# Patient Record
Sex: MALE | Race: ASIAN | Employment: OTHER | URBAN - METROPOLITAN AREA
[De-identification: names, ages, dates, MRNs, and addresses within clinical notes are randomized per-mention and may not be internally consistent; named-entity substitution may affect disease eponyms.]

---

## 2017-09-05 ENCOUNTER — LAB ENCOUNTER (OUTPATIENT)
Dept: LAB | Facility: HOSPITAL | Age: 82
End: 2017-09-05
Attending: FAMILY MEDICINE
Payer: MEDICARE

## 2017-09-05 DIAGNOSIS — D64.9 ABSOLUTE ANEMIA: Primary | ICD-10-CM

## 2017-09-05 DIAGNOSIS — I25.10 CORONARY ATHEROSCLEROSIS OF NATIVE CORONARY ARTERY: ICD-10-CM

## 2017-09-05 DIAGNOSIS — N18.30 CHRONIC KIDNEY DISEASE, STAGE III (MODERATE) (HCC): ICD-10-CM

## 2017-09-05 DIAGNOSIS — Z00.01 ENCOUNTER FOR GENERAL ADULT MEDICAL EXAMINATION WITH ABNORMAL FINDINGS: ICD-10-CM

## 2017-09-05 LAB
ALBUMIN SERPL-MCNC: 3.8 G/DL (ref 3.5–4.8)
ALP LIVER SERPL-CCNC: 59 U/L (ref 45–117)
ALT SERPL-CCNC: 25 U/L (ref 17–63)
AST SERPL-CCNC: 19 U/L (ref 15–41)
BASOPHILS # BLD AUTO: 0.02 X10(3) UL (ref 0–0.1)
BASOPHILS NFR BLD AUTO: 0.4 %
BILIRUB SERPL-MCNC: 0.4 MG/DL (ref 0.1–2)
BUN BLD-MCNC: 22 MG/DL (ref 8–20)
CALCIUM BLD-MCNC: 9 MG/DL (ref 8.3–10.3)
CHLORIDE: 108 MMOL/L (ref 101–111)
CHOLEST SMN-MCNC: 168 MG/DL (ref ?–200)
CO2: 27 MMOL/L (ref 22–32)
CREAT BLD-MCNC: 1.33 MG/DL (ref 0.7–1.3)
EOSINOPHIL # BLD AUTO: 0.19 X10(3) UL (ref 0–0.3)
EOSINOPHIL NFR BLD AUTO: 4 %
ERYTHROCYTE [DISTWIDTH] IN BLOOD BY AUTOMATED COUNT: 12.8 % (ref 11.5–16)
GLUCOSE BLD-MCNC: 98 MG/DL (ref 70–99)
HCT VFR BLD AUTO: 36.1 % (ref 37–53)
HDLC SERPL-MCNC: 51 MG/DL (ref 45–?)
HDLC SERPL: 3.29 {RATIO} (ref ?–4.97)
HGB BLD-MCNC: 11.9 G/DL (ref 13–17)
IMMATURE GRANULOCYTE COUNT: 0.01 X10(3) UL (ref 0–1)
IMMATURE GRANULOCYTE RATIO %: 0.2 %
LDLC SERPL CALC-MCNC: 84 MG/DL (ref ?–130)
LDLC SERPL-MCNC: 33 MG/DL (ref 5–40)
LYMPHOCYTES # BLD AUTO: 1.61 X10(3) UL (ref 0.9–4)
LYMPHOCYTES NFR BLD AUTO: 34.1 %
M PROTEIN MFR SERPL ELPH: 7.4 G/DL (ref 6.1–8.3)
MCH RBC QN AUTO: 29.1 PG (ref 27–33.2)
MCHC RBC AUTO-ENTMCNC: 33 G/DL (ref 31–37)
MCV RBC AUTO: 88.3 FL (ref 80–99)
MONOCYTES # BLD AUTO: 0.55 X10(3) UL (ref 0.1–0.6)
MONOCYTES NFR BLD AUTO: 11.7 %
NEUTROPHIL ABS PRELIM: 2.34 X10 (3) UL (ref 1.3–6.7)
NEUTROPHILS # BLD AUTO: 2.34 X10(3) UL (ref 1.3–6.7)
NEUTROPHILS NFR BLD AUTO: 49.6 %
NONHDLC SERPL-MCNC: 117 MG/DL (ref ?–130)
PLATELET # BLD AUTO: 188 10(3)UL (ref 150–450)
POTASSIUM SERPL-SCNC: 4.9 MMOL/L (ref 3.6–5.1)
PSA SERPL-MCNC: 0.49 NG/ML (ref 0.01–4)
RBC # BLD AUTO: 4.09 X10(6)UL (ref 3.8–5.8)
RED CELL DISTRIBUTION WIDTH-SD: 41.5 FL (ref 35.1–46.3)
SODIUM SERPL-SCNC: 142 MMOL/L (ref 136–144)
TRIGLYCERIDES: 165 MG/DL (ref ?–150)
WBC # BLD AUTO: 4.7 X10(3) UL (ref 4–13)

## 2017-09-05 PROCEDURE — 36415 COLL VENOUS BLD VENIPUNCTURE: CPT

## 2017-09-05 PROCEDURE — 80061 LIPID PANEL: CPT

## 2017-09-05 PROCEDURE — 84153 ASSAY OF PSA TOTAL: CPT

## 2017-09-05 PROCEDURE — 85025 COMPLETE CBC W/AUTO DIFF WBC: CPT

## 2017-09-05 PROCEDURE — 80053 COMPREHEN METABOLIC PANEL: CPT

## 2018-06-12 PROBLEM — R94.39 ABNORMAL NUCLEAR STRESS TEST: Status: ACTIVE | Noted: 2018-06-12

## 2018-06-12 PROBLEM — R53.82 CHRONIC FATIGUE: Status: ACTIVE | Noted: 2018-06-12

## 2018-06-18 ENCOUNTER — HOSPITAL ENCOUNTER (OUTPATIENT)
Dept: CT IMAGING | Facility: HOSPITAL | Age: 83
Discharge: HOME | End: 2018-06-18
Attending: INTERNAL MEDICINE
Payer: MEDICARE

## 2018-06-18 VITALS — DIASTOLIC BLOOD PRESSURE: 69 MMHG | HEART RATE: 56 BPM | SYSTOLIC BLOOD PRESSURE: 183 MMHG

## 2018-06-18 DIAGNOSIS — I25.10 CORONARY ARTERY DISEASE INVOLVING NATIVE CORONARY ARTERY OF NATIVE HEART WITHOUT ANGINA PECTORIS: ICD-10-CM

## 2018-06-18 DIAGNOSIS — R94.39 ABNORMAL NUCLEAR STRESS TEST: ICD-10-CM

## 2018-06-18 DIAGNOSIS — R53.82 CHRONIC FATIGUE: ICD-10-CM

## 2018-06-18 PROCEDURE — 75574 CT ANGIO HRT W/3D IMAGE: CPT | Performed by: INTERNAL MEDICINE

## 2018-06-18 PROCEDURE — 96361 HYDRATE IV INFUSION ADD-ON: CPT | Performed by: INTERNAL MEDICINE

## 2018-06-18 PROCEDURE — 96360 HYDRATION IV INFUSION INIT: CPT | Performed by: INTERNAL MEDICINE

## 2018-06-18 RX ORDER — SODIUM CHLORIDE 9 MG/ML
INJECTION, SOLUTION INTRAVENOUS CONTINUOUS
Status: ACTIVE | OUTPATIENT
Start: 2018-06-18 | End: 2018-06-18

## 2018-06-18 RX ORDER — NITROGLYCERIN 0.4 MG/1
TABLET SUBLINGUAL
Status: COMPLETED
Start: 2018-06-18 | End: 2018-06-18

## 2018-06-18 RX ADMIN — SODIUM CHLORIDE 250 ML: 9 INJECTION, SOLUTION INTRAVENOUS at 08:45:00

## 2018-06-18 RX ADMIN — NITROGLYCERIN 0.4 MG: 0.4 TABLET SUBLINGUAL at 11:04:00

## 2018-06-23 ENCOUNTER — LAB ENCOUNTER (OUTPATIENT)
Dept: LAB | Facility: HOSPITAL | Age: 83
End: 2018-06-23
Attending: INTERNAL MEDICINE
Payer: MEDICARE

## 2018-06-23 DIAGNOSIS — R94.39 ABNORMAL NUCLEAR STRESS TEST: ICD-10-CM

## 2018-06-23 DIAGNOSIS — Z01.812 PRE-PROCEDURE LAB EXAM: ICD-10-CM

## 2018-06-23 DIAGNOSIS — I10 ESSENTIAL HYPERTENSION: ICD-10-CM

## 2018-06-23 PROCEDURE — 36415 COLL VENOUS BLD VENIPUNCTURE: CPT

## 2018-06-23 PROCEDURE — 80048 BASIC METABOLIC PNL TOTAL CA: CPT

## 2018-06-23 PROCEDURE — 85025 COMPLETE CBC W/AUTO DIFF WBC: CPT

## 2018-06-26 ENCOUNTER — HOSPITAL ENCOUNTER (OUTPATIENT)
Dept: INTERVENTIONAL RADIOLOGY/VASCULAR | Facility: HOSPITAL | Age: 83
Discharge: HOME | End: 2018-06-26
Attending: INTERNAL MEDICINE | Admitting: INTERNAL MEDICINE
Payer: MEDICARE

## 2018-06-26 VITALS
WEIGHT: 120 LBS | OXYGEN SATURATION: 100 % | HEIGHT: 66 IN | SYSTOLIC BLOOD PRESSURE: 165 MMHG | BODY MASS INDEX: 19.29 KG/M2 | TEMPERATURE: 98 F | HEART RATE: 50 BPM | DIASTOLIC BLOOD PRESSURE: 57 MMHG | RESPIRATION RATE: 14 BRPM

## 2018-06-26 DIAGNOSIS — R94.39 ABNORMAL STRESS TEST: ICD-10-CM

## 2018-06-26 PROCEDURE — 99153 MOD SED SAME PHYS/QHP EA: CPT

## 2018-06-26 PROCEDURE — B2111ZZ FLUOROSCOPY OF MULTIPLE CORONARY ARTERIES USING LOW OSMOLAR CONTRAST: ICD-10-PCS | Performed by: INTERNAL MEDICINE

## 2018-06-26 PROCEDURE — 99152 MOD SED SAME PHYS/QHP 5/>YRS: CPT

## 2018-06-26 PROCEDURE — 93454 CORONARY ARTERY ANGIO S&I: CPT

## 2018-06-26 RX ORDER — HEPARIN SODIUM 5000 [USP'U]/ML
INJECTION, SOLUTION INTRAVENOUS; SUBCUTANEOUS
Status: COMPLETED
Start: 2018-06-26 | End: 2018-06-26

## 2018-06-26 RX ORDER — CLOPIDOGREL BISULFATE 75 MG/1
TABLET ORAL
Status: COMPLETED
Start: 2018-06-26 | End: 2018-06-26

## 2018-06-26 RX ORDER — VERAPAMIL HYDROCHLORIDE 2.5 MG/ML
INJECTION, SOLUTION INTRAVENOUS
Status: COMPLETED
Start: 2018-06-26 | End: 2018-06-26

## 2018-06-26 RX ORDER — SODIUM CHLORIDE 9 MG/ML
INJECTION, SOLUTION INTRAVENOUS CONTINUOUS
Status: DISCONTINUED | OUTPATIENT
Start: 2018-06-26 | End: 2018-06-26

## 2018-06-26 RX ORDER — CLOPIDOGREL BISULFATE 75 MG/1
75 TABLET ORAL DAILY
Qty: 30 TABLET | Refills: 3 | Status: ON HOLD | OUTPATIENT
Start: 2018-06-26 | End: 2018-06-30

## 2018-06-26 RX ORDER — LIDOCAINE HYDROCHLORIDE 10 MG/ML
INJECTION, SOLUTION EPIDURAL; INFILTRATION; INTRACAUDAL; PERINEURAL
Status: COMPLETED
Start: 2018-06-26 | End: 2018-06-26

## 2018-06-26 RX ORDER — CLOPIDOGREL BISULFATE 75 MG/1
75 TABLET ORAL DAILY
Status: DISCONTINUED | OUTPATIENT
Start: 2018-06-26 | End: 2018-06-26

## 2018-06-26 RX ORDER — MIDAZOLAM HYDROCHLORIDE 1 MG/ML
INJECTION INTRAMUSCULAR; INTRAVENOUS
Status: COMPLETED
Start: 2018-06-26 | End: 2018-06-26

## 2018-06-26 RX ADMIN — SODIUM CHLORIDE: 9 INJECTION, SOLUTION INTRAVENOUS at 14:45:00

## 2018-06-26 NOTE — PROCEDURES
[facility]    PATIENT'S NAME: Kishor Mcqueen   ATTENDING PHYSICIAN: Geovanni Sagastume M.D. OPERATING PHYSICIAN: Pedro Baker MD   PATIENT ACCOUNT#:   [id number]    LOCATION:  [address] [place] [address] Dr MCRAE [address]  MEDICAL RECORD #:   [id number]       D During the case, the patient became somewhat disinhibited and was moving his arm quite a bit. Therefore, I opted to not perform ad hoc coronary intervention.   I feel bringing the patient back and utilizing anesthesia for possible propofol infusion would b

## 2018-06-26 NOTE — PROGRESS NOTES
S/P LHC , right tr band in place. pleth present. Pt denies pain. VSS. Air removed from TR band without difficulty. 1st dose of Plavix given and prescription sent electronically [facility]. Pt valeria po well and ambulated well. Voided x 2.  Family at bedsid

## 2018-06-26 NOTE — PROGRESS NOTES
Cath:    LM: No significant disease. LAD: Patent stent. No significant ISR. Mild, diffuse disease. CX: 80% after OM then 60% into OM2. RCA: 90% mid vessel. Sedation: F/V from 0035-3781 under my direct supervision. PLAN: Will stage intervention.

## 2018-06-29 ENCOUNTER — HOSPITAL ENCOUNTER (OUTPATIENT)
Dept: INTERVENTIONAL RADIOLOGY/VASCULAR | Facility: HOSPITAL | Age: 83
Discharge: HOME | End: 2018-06-30
Attending: INTERNAL MEDICINE | Admitting: INTERNAL MEDICINE
Payer: MEDICARE

## 2018-06-29 DIAGNOSIS — I25.10 CAD (CORONARY ARTERY DISEASE): ICD-10-CM

## 2018-06-29 PROCEDURE — 4A033BC MEASUREMENT OF ARTERIAL PRESSURE, CORONARY, PERCUTANEOUS APPROACH: ICD-10-PCS | Performed by: INTERNAL MEDICINE

## 2018-06-29 PROCEDURE — 93010 ELECTROCARDIOGRAM REPORT: CPT | Performed by: INTERNAL MEDICINE

## 2018-06-29 PROCEDURE — 027135Z DILATION OF CORONARY ARTERY, TWO ARTERIES WITH TWO DRUG-ELUTING INTRALUMINAL DEVICES, PERCUTANEOUS APPROACH: ICD-10-PCS | Performed by: INTERNAL MEDICINE

## 2018-06-29 PROCEDURE — 93571 IV DOP VEL&/PRESS C FLO 1ST: CPT

## 2018-06-29 PROCEDURE — 93005 ELECTROCARDIOGRAM TRACING: CPT

## 2018-06-29 RX ORDER — HEPARIN SODIUM 5000 [USP'U]/ML
INJECTION, SOLUTION INTRAVENOUS; SUBCUTANEOUS
Status: COMPLETED
Start: 2018-06-29 | End: 2018-06-29

## 2018-06-29 RX ORDER — CLOPIDOGREL BISULFATE 75 MG/1
75 TABLET ORAL DAILY
Status: DISCONTINUED | OUTPATIENT
Start: 2018-06-30 | End: 2018-06-29

## 2018-06-29 RX ORDER — SODIUM CHLORIDE, SODIUM LACTATE, POTASSIUM CHLORIDE, CALCIUM CHLORIDE 600; 310; 30; 20 MG/100ML; MG/100ML; MG/100ML; MG/100ML
INJECTION, SOLUTION INTRAVENOUS CONTINUOUS
Status: DISCONTINUED | OUTPATIENT
Start: 2018-06-29 | End: 2018-06-30

## 2018-06-29 RX ORDER — AMLODIPINE BESYLATE 5 MG/1
5 TABLET ORAL DAILY
Status: DISCONTINUED | OUTPATIENT
Start: 2018-06-29 | End: 2018-06-30

## 2018-06-29 RX ORDER — METOCLOPRAMIDE HYDROCHLORIDE 5 MG/ML
10 INJECTION INTRAMUSCULAR; INTRAVENOUS AS NEEDED
Status: ACTIVE | OUTPATIENT
Start: 2018-06-29 | End: 2018-06-30

## 2018-06-29 RX ORDER — ONDANSETRON 2 MG/ML
4 INJECTION INTRAMUSCULAR; INTRAVENOUS AS NEEDED
Status: ACTIVE | OUTPATIENT
Start: 2018-06-29 | End: 2018-06-30

## 2018-06-29 RX ORDER — CLOPIDOGREL BISULFATE 75 MG/1
75 TABLET ORAL DAILY
Status: DISCONTINUED | OUTPATIENT
Start: 2018-06-29 | End: 2018-06-29

## 2018-06-29 RX ORDER — FINASTERIDE 5 MG/1
5 TABLET, FILM COATED ORAL NIGHTLY
Status: DISCONTINUED | OUTPATIENT
Start: 2018-06-29 | End: 2018-06-30

## 2018-06-29 RX ORDER — ASPIRIN 81 MG/1
81 TABLET ORAL DAILY
Status: DISCONTINUED | OUTPATIENT
Start: 2018-06-29 | End: 2018-06-29

## 2018-06-29 RX ORDER — LIDOCAINE HYDROCHLORIDE 10 MG/ML
INJECTION, SOLUTION EPIDURAL; INFILTRATION; INTRACAUDAL; PERINEURAL
Status: COMPLETED
Start: 2018-06-29 | End: 2018-06-29

## 2018-06-29 RX ORDER — METOPROLOL SUCCINATE 25 MG/1
25 TABLET, EXTENDED RELEASE ORAL DAILY
Status: DISCONTINUED | OUTPATIENT
Start: 2018-06-29 | End: 2018-06-30

## 2018-06-29 RX ORDER — NICARDIPINE HYDROCHLORIDE 2.5 MG/ML
INJECTION INTRAVENOUS
Status: COMPLETED
Start: 2018-06-29 | End: 2018-06-29

## 2018-06-29 RX ORDER — LOSARTAN POTASSIUM 100 MG/1
100 TABLET ORAL DAILY
Status: DISCONTINUED | OUTPATIENT
Start: 2018-06-29 | End: 2018-06-30

## 2018-06-29 RX ORDER — ATORVASTATIN CALCIUM 20 MG/1
20 TABLET, FILM COATED ORAL DAILY
Status: DISCONTINUED | OUTPATIENT
Start: 2018-06-29 | End: 2018-06-29

## 2018-06-29 RX ORDER — CLOPIDOGREL BISULFATE 75 MG/1
75 TABLET ORAL DAILY
Status: DISCONTINUED | OUTPATIENT
Start: 2018-06-29 | End: 2018-06-30

## 2018-06-29 RX ORDER — FINASTERIDE 5 MG/1
5 TABLET, FILM COATED ORAL DAILY
Status: DISCONTINUED | OUTPATIENT
Start: 2018-06-29 | End: 2018-06-29

## 2018-06-29 RX ORDER — ASPIRIN 325 MG
325 TABLET, DELAYED RELEASE (ENTERIC COATED) ORAL DAILY
Status: DISCONTINUED | OUTPATIENT
Start: 2018-06-29 | End: 2018-06-30

## 2018-06-29 RX ORDER — ASPIRIN 81 MG/1
324 TABLET, CHEWABLE ORAL ONCE
Status: DISCONTINUED | OUTPATIENT
Start: 2018-06-29 | End: 2018-06-29 | Stop reason: HOSPADM

## 2018-06-29 RX ORDER — NALOXONE HYDROCHLORIDE 0.4 MG/ML
80 INJECTION, SOLUTION INTRAMUSCULAR; INTRAVENOUS; SUBCUTANEOUS AS NEEDED
Status: ACTIVE | OUTPATIENT
Start: 2018-06-29 | End: 2018-06-30

## 2018-06-29 RX ORDER — ALFUZOSIN HYDROCHLORIDE 10 MG/1
10 TABLET, EXTENDED RELEASE ORAL NIGHTLY
Status: DISCONTINUED | OUTPATIENT
Start: 2018-06-29 | End: 2018-06-30

## 2018-06-29 RX ORDER — ALFUZOSIN HYDROCHLORIDE 10 MG/1
10 TABLET, EXTENDED RELEASE ORAL
Status: DISCONTINUED | OUTPATIENT
Start: 2018-06-30 | End: 2018-06-29

## 2018-06-29 RX ORDER — SODIUM CHLORIDE 9 MG/ML
INJECTION, SOLUTION INTRAVENOUS CONTINUOUS
Status: DISCONTINUED | OUTPATIENT
Start: 2018-06-29 | End: 2018-06-29

## 2018-06-29 RX ORDER — ATORVASTATIN CALCIUM 20 MG/1
20 TABLET, FILM COATED ORAL NIGHTLY
Status: DISCONTINUED | OUTPATIENT
Start: 2018-06-29 | End: 2018-06-30

## 2018-06-29 RX ORDER — AMLODIPINE BESYLATE 5 MG/1
5 TABLET ORAL DAILY
COMMUNITY
End: 2018-09-07

## 2018-06-29 RX ORDER — SODIUM CHLORIDE 9 MG/ML
INJECTION, SOLUTION INTRAVENOUS CONTINUOUS
Status: ACTIVE | OUTPATIENT
Start: 2018-06-29 | End: 2018-06-29

## 2018-06-29 RX ORDER — NITROGLYCERIN 0.4 MG/1
0.4 TABLET SUBLINGUAL EVERY 5 MIN PRN
Status: DISCONTINUED | OUTPATIENT
Start: 2018-06-29 | End: 2018-06-30

## 2018-06-29 RX ADMIN — FINASTERIDE 5 MG: 5 TABLET, FILM COATED ORAL at 20:14:00

## 2018-06-29 RX ADMIN — ATORVASTATIN CALCIUM 20 MG: 20 TABLET, FILM COATED ORAL at 20:14:00

## 2018-06-29 NOTE — H&P
Cards      See recent cath and clinic notes for details. Planned PCI to RCA and CX. Anesthesia needed due to movement with conscious sedation.     Patient Active Problem List:     Hyperlipidemia     HTN (hypertension)     CAD (coronary artery disease)

## 2018-06-29 NOTE — PROGRESS NOTES
Cath:    PCI to RCA: Micro access. 7F slender sheath. JR4SH guide. BMW wire. Predilated with 2.5x12mm balloon. 3.0x18mm Xience at 14 TAMMY. Posted with 3.25 NC to 16ATM. PCI to CX: BMW in CX. Prowater in OM. Predilated with 2.5mm balloon.   2.5x23m

## 2018-06-30 VITALS
SYSTOLIC BLOOD PRESSURE: 130 MMHG | HEIGHT: 66 IN | RESPIRATION RATE: 18 BRPM | BODY MASS INDEX: 19.29 KG/M2 | OXYGEN SATURATION: 97 % | TEMPERATURE: 98 F | WEIGHT: 120 LBS | HEART RATE: 77 BPM | DIASTOLIC BLOOD PRESSURE: 59 MMHG

## 2018-06-30 LAB
ATRIAL RATE: 62 BPM
ATRIAL RATE: 78 BPM
BUN BLD-MCNC: 25 MG/DL (ref 8–20)
CALCIUM BLD-MCNC: 9 MG/DL (ref 8.3–10.3)
CHLORIDE: 106 MMOL/L (ref 101–111)
CO2: 23 MMOL/L (ref 22–32)
CREAT BLD-MCNC: 1.38 MG/DL (ref 0.7–1.3)
ERYTHROCYTE [DISTWIDTH] IN BLOOD BY AUTOMATED COUNT: 13 % (ref 11.5–16)
GLUCOSE BLD-MCNC: 136 MG/DL (ref 70–99)
HCT VFR BLD AUTO: 32.9 % (ref 37–53)
HGB BLD-MCNC: 11.2 G/DL (ref 13–17)
MCH RBC QN AUTO: 29.6 PG (ref 27–33.2)
MCHC RBC AUTO-ENTMCNC: 34 G/DL (ref 31–37)
MCV RBC AUTO: 87 FL (ref 80–99)
P AXIS: 64 DEGREES
P AXIS: 69 DEGREES
P-R INTERVAL: 158 MS
P-R INTERVAL: 174 MS
PLATELET # BLD AUTO: 173 10(3)UL (ref 150–450)
POTASSIUM SERPL-SCNC: 4.8 MMOL/L (ref 3.6–5.1)
Q-T INTERVAL: 404 MS
Q-T INTERVAL: 436 MS
QRS DURATION: 84 MS
QRS DURATION: 90 MS
QTC CALCULATION (BEZET): 442 MS
QTC CALCULATION (BEZET): 460 MS
R AXIS: 93 DEGREES
R AXIS: 96 DEGREES
RBC # BLD AUTO: 3.78 X10(6)UL (ref 3.8–5.8)
RED CELL DISTRIBUTION WIDTH-SD: 41.1 FL (ref 35.1–46.3)
SODIUM SERPL-SCNC: 139 MMOL/L (ref 136–144)
T AXIS: 46 DEGREES
T AXIS: 79 DEGREES
VENTRICULAR RATE: 62 BPM
VENTRICULAR RATE: 78 BPM
WBC # BLD AUTO: 8.1 X10(3) UL (ref 4–13)

## 2018-06-30 PROCEDURE — 93010 ELECTROCARDIOGRAM REPORT: CPT | Performed by: INTERNAL MEDICINE

## 2018-06-30 PROCEDURE — 93005 ELECTROCARDIOGRAM TRACING: CPT

## 2018-06-30 PROCEDURE — 85027 COMPLETE CBC AUTOMATED: CPT | Performed by: INTERNAL MEDICINE

## 2018-06-30 PROCEDURE — 80048 BASIC METABOLIC PNL TOTAL CA: CPT | Performed by: INTERNAL MEDICINE

## 2018-06-30 RX ORDER — CLOPIDOGREL BISULFATE 75 MG/1
75 TABLET ORAL DAILY
Qty: 90 TABLET | Refills: 1 | Status: SHIPPED | OUTPATIENT
Start: 2018-06-30 | End: 2019-02-18

## 2018-06-30 RX ADMIN — METOPROLOL SUCCINATE 25 MG: 25 TABLET, EXTENDED RELEASE ORAL at 09:15:00

## 2018-06-30 RX ADMIN — ASPIRIN 325 MG: 325 MG TABLET, DELAYED RELEASE (ENTERIC COATED) ORAL at 09:15:00

## 2018-06-30 RX ADMIN — LOSARTAN POTASSIUM 100 MG: 100 TABLET ORAL at 09:14:00

## 2018-06-30 RX ADMIN — AMLODIPINE BESYLATE 5 MG: 5 TABLET ORAL at 09:14:00

## 2018-06-30 RX ADMIN — CLOPIDOGREL BISULFATE 75 MG: 75 TABLET ORAL at 09:14:00

## 2018-06-30 NOTE — PLAN OF CARE
CARDIOVASCULAR - ADULT    • Maintains optimal cardiac output and hemodynamic stability Progressing    • Absence of cardiac arrhythmias or at baseline Progressing          Assumed patient care at 0730. Vital signs stable. Patient alert and oriented x 4.

## 2018-06-30 NOTE — PROGRESS NOTES
Rec'd telephone report from [facility] (separately at different times). Pt transferred to unit via bed; only IVF running. Right femoral site verified with PACU RN. Pedal pulse site is strong, 3+. Telemetry monitor &  applied. VSS.   Right walter

## 2018-06-30 NOTE — PROGRESS NOTES
[facility]  Progress Note    Roxanne Miranda Patient Status:  Outpatient in a Bed    1935 MRN [id number]   [facility] 8NE-A Attending Rosy Mccullough MD   Hosp Day # 0 PCP Charo Ryder MD     Impression:  1.  C Oral Daily   lactated ringers infusion  Intravenous Continuous   AmLODIPine Besylate (NORVASC) tab 5 mg 5 mg Oral Daily   aspirin EC EC tab 325 mg 325 mg Oral Daily   Metoprolol Succinate ER (Toprol XL) 24 hr tab 25 mg 25 mg Oral Daily   nitroGLYCERIN (NIT

## 2018-06-30 NOTE — DIETARY NOTE
Nutrition Short Note    Dietitian consult received per cardiac rehab/CHF protocol. Pt to be educated by cardiac rehab staff and encouraged to attend outpatient classes taught by RD. BRETT available PRN.     Gino Zurita RD, RENEN

## 2018-07-30 NOTE — PROCEDURES
[facility]    PATIENT'S NAME: Alia Talbot   ATTENDING PHYSICIAN: Andrew Mathews. Lalo Cardenas MD   OPERATING PHYSICIAN: Andrew Mathews.  Lalo Cardenas MD   PATIENT ACCOUNT#:   [id number]    LOCATION:  [address] [place] [address]  MEDICAL RECORD #:   [id number]       DATE OF BIRTH:  [birth year]. We then turned our attention to the obtuse marginal, which had moderate disease in it. We performed IFR of the marginal with a pressure wire. It was zeroed and normalized in standard fashion. The IFR was 0.99 (very clearly negative).   We took f

## 2019-01-24 PROCEDURE — 82570 ASSAY OF URINE CREATININE: CPT | Performed by: FAMILY MEDICINE

## 2019-01-24 PROCEDURE — 82043 UR ALBUMIN QUANTITATIVE: CPT | Performed by: FAMILY MEDICINE

## 2019-05-18 ENCOUNTER — HOSPITAL ENCOUNTER (EMERGENCY)
Facility: HOSPITAL | Age: 84
Discharge: HOME | End: 2019-05-18
Attending: EMERGENCY MEDICINE
Payer: MEDICARE

## 2019-05-18 VITALS
WEIGHT: 170 LBS | HEART RATE: 75 BPM | DIASTOLIC BLOOD PRESSURE: 65 MMHG | OXYGEN SATURATION: 99 % | TEMPERATURE: 99 F | RESPIRATION RATE: 16 BRPM | BODY MASS INDEX: 27.32 KG/M2 | SYSTOLIC BLOOD PRESSURE: 142 MMHG | HEIGHT: 66 IN

## 2019-05-18 DIAGNOSIS — H11.32 SUBCONJUNCTIVAL HEMORRHAGE OF LEFT EYE: Primary | ICD-10-CM

## 2019-05-18 PROCEDURE — 99282 EMERGENCY DEPT VISIT SF MDM: CPT

## 2019-05-18 PROCEDURE — 99281 EMR DPT VST MAYX REQ PHY/QHP: CPT

## 2019-05-18 NOTE — ED PROVIDER NOTES
Patient Seen in: [facility] Emergency Department    History   Patient presents with:   Eye Visual Problem (opthalmic)  Trauma (cardiovascular, musculoskeletal)    Stated Complaint: left eye injury; redness and blood noted now    HPI    Patient is a 8 Chart Acuity: 20/25, Corrected(pt has cateracts)  Left Eye Chart Acuity: 20/25,      Physical Exam   Eyes: Pupils are equal, round, and reactive to light. Conjunctivae and EOM are normal. Right eye exhibits no discharge. Left eye exhibits no discharge.  No no preference

## 2019-06-27 ENCOUNTER — HOSPITAL ENCOUNTER (EMERGENCY)
Facility: HOSPITAL | Age: 84
Discharge: HOME | End: 2019-06-27
Attending: EMERGENCY MEDICINE
Payer: MEDICARE

## 2019-06-27 VITALS
OXYGEN SATURATION: 100 % | BODY MASS INDEX: 19.29 KG/M2 | SYSTOLIC BLOOD PRESSURE: 152 MMHG | HEART RATE: 77 BPM | DIASTOLIC BLOOD PRESSURE: 74 MMHG | HEIGHT: 66 IN | TEMPERATURE: 98 F | RESPIRATION RATE: 16 BRPM | WEIGHT: 120 LBS

## 2019-06-27 DIAGNOSIS — R33.9 URINARY RETENTION: Primary | ICD-10-CM

## 2019-06-27 PROCEDURE — 51702 INSERT TEMP BLADDER CATH: CPT

## 2019-06-27 PROCEDURE — 99283 EMERGENCY DEPT VISIT LOW MDM: CPT

## 2019-06-27 PROCEDURE — 81001 URINALYSIS AUTO W/SCOPE: CPT | Performed by: EMERGENCY MEDICINE

## 2019-06-27 NOTE — ED PROVIDER NOTES
Patient Seen in: [facility] Emergency Department    History   Patient presents with:  Urinary Symptoms (urologic)    Stated Complaint: urinary retention    HPI    Is a 52-year-old gentleman here with urinary retention symptoms.   He has a history of No distress. HENT:   Head: Normocephalic and atraumatic. Eyes: Conjunctivae are normal. No scleral icterus. Neck: Normal range of motion. Neck supple. Cardiovascular: Normal rate and intact distal pulses.    Pulmonary/Chest: Effort normal. No respir

## 2019-06-27 NOTE — CM/SW NOTE
I was asked to assist patient in arranging home care follow up for douglas catheter care. Pt is very nervous and hesitant about going home with catheter. ER MD asked for follow up for further education and assessment of need at home.   Pt is AAOx4, ambulato

## 2019-06-27 NOTE — ED INITIAL ASSESSMENT (HPI)
Pt presents to ed via ems for urinary retention, pt states last void was 2200 this evening. Pt also states burning with urination. Pt is a&ox4, moves all extremities well, resps easy.

## 2019-07-01 ENCOUNTER — DIAGNOSTIC TRANS (OUTPATIENT)
Dept: OTHER | Age: 84
End: 2019-07-01

## 2019-07-01 LAB
ANALYZER ANC (IANC): ABNORMAL
BUN SERPL-MCNC: 15 MG/DL (ref 6–20)
BUN/CREAT SERPL: 14 (ref 7–25)
CREAT SERPL-MCNC: 1.09 MG/DL (ref 0.67–1.17)
ERYTHROCYTE [DISTWIDTH] IN BLOOD: 12.3 % (ref 11–15)
HCT VFR BLD CALC: 32.6 % (ref 39–51)
HGB BLD-MCNC: 10.8 G/DL (ref 13–17)
MCH RBC QN AUTO: 29.8 PG (ref 26–34)
MCHC RBC AUTO-ENTMCNC: 33.1 G/DL (ref 32–36.5)
MCV RBC AUTO: 89.8 FL (ref 78–100)
NRBC (NRBCRE): 0 /100 WBC
PLATELET # BLD: 288 K/MCL (ref 140–450)
RBC # BLD: 3.63 MIL/MCL (ref 4.5–5.9)
WBC # BLD: 8.3 K/MCL (ref 4.2–11)

## 2019-07-02 ENCOUNTER — HOSPITAL (OUTPATIENT)
Dept: OTHER | Age: 84
End: 2019-07-02

## 2019-09-11 ENCOUNTER — HOSPITAL (OUTPATIENT)
Dept: OTHER | Age: 84
End: 2019-09-11
Attending: UROLOGY

## 2020-01-15 PROBLEM — I25.119 ATHEROSCLEROSIS OF NATIVE CORONARY ARTERY OF NATIVE HEART WITH ANGINA PECTORIS (HCC): Status: ACTIVE | Noted: 2020-01-15

## 2020-01-15 PROBLEM — N18.30 CHRONIC KIDNEY DISEASE, STAGE III (MODERATE) (HCC): Status: ACTIVE | Noted: 2020-01-15

## 2020-01-15 PROBLEM — I25.119 ATHEROSCLEROSIS OF NATIVE CORONARY ARTERY OF NATIVE HEART WITH ANGINA PECTORIS: Status: ACTIVE | Noted: 2020-01-15

## 2021-06-01 PROBLEM — G57.60 MORTON NEUROMA: Status: ACTIVE | Noted: 2021-06-01

## 2021-06-01 PROBLEM — H34.00 TRANSIENT ARTERIAL RETINAL OCCLUSION: Status: ACTIVE | Noted: 2021-06-01

## 2021-06-01 PROBLEM — N40.1 LOWER URINARY TRACT SYMPTOMS DUE TO BENIGN PROSTATIC HYPERPLASIA: Status: ACTIVE | Noted: 2021-06-01

## 2021-06-01 PROBLEM — N18.1 STAGE 1 CHRONIC KIDNEY DISEASE: Status: ACTIVE | Noted: 2020-01-15

## 2021-06-01 PROBLEM — I10 BENIGN ESSENTIAL HYPERTENSION: Status: ACTIVE | Noted: 2021-06-01

## 2021-06-01 PROBLEM — I65.29 CAROTID ARTERY OCCLUSION WITHOUT INFARCTION: Status: ACTIVE | Noted: 2021-06-01

## 2022-03-17 PROBLEM — N18.30 STAGE 3 CHRONIC KIDNEY DISEASE, UNSPECIFIED WHETHER STAGE 3A OR 3B CKD (HCC): Status: ACTIVE | Noted: 2022-03-17

## 2022-03-17 PROBLEM — I73.9 PVD (PERIPHERAL VASCULAR DISEASE): Status: ACTIVE | Noted: 2022-03-17

## 2022-11-09 NOTE — H&P
The patient was seen and examined. There was no change in the patient's clinical status from the date of the last progress note, to the date of their procedure today at [facility]. Thus, the progress note is up-to-date.     Murray Atkins MD, Endarterectomy R   Family History: There is no family history of premature coronary artery disease or sudden cardiac death. family history includes Heart Disorder in his brother and mother. Social History:    reports that he has never smoked.  He has nev negative for temperature intolerance        PHYSICAL EXAM:   /64 (BP Location: Right arm, Patient Position: Sitting, Cuff Size: adult)   Pulse 60   Ht 5' 6\" (1.676 m)   Wt 121 lb (54.9 kg)   BMI 19.53 kg/m²   General: Awake and alert; in no acute di no ----------      ALT (SGPT) (IU/L)   Date Value   02/08/2014 15   ----------      ALT (U/L)   Date Value   04/08/2014 23   04/03/2014 25   10/30/2013 24   06/27/2013 20   10/10/2012 27   ----------      Alt (U/L)   Date Value   09/05/2017 25   10/11/2016

## 2025-07-22 NOTE — H&P
[facility]    Division of Cardiology    Updated Procedural H&P      Silver Rowland Patient Status:  Outpatient    1935 MRN [id number]   Location [facility] AMBULATORY CARE CENTER Attending Ole Castillo,*   Hosp Day # 0 PCP Minh Nugent MD          Patient Name: Silver Rowland  MRN: [id number]  CSN: [id number]  YOB: 1935    Diagnosis: CAD    Present Illness:   Hx CAD with PCI in the past (most recent ), RCA and CX.  See EPIC clinic notes.     Interval change:    None.     Home Medications:  Medications Prior to Admission   Medication Sig    metoprolol succinate ER 25 MG Oral Tablet 24 Hr Take 1 tablet (25 mg total) by mouth daily.    atorvastatin 20 MG Oral Tab Take 1 tablet (20 mg total) by mouth daily. (Patient taking differently: Take 2 tablets (40 mg total) by mouth in the morning.)    amLODIPine 5 MG Oral Tab Take 1 tablet (5 mg total) by mouth daily.    aspirin (ASPIRIN LOW DOSE) 81 MG Oral Tab EC Take 1 tablet (81 mg total) by mouth daily.    zolpidem 5 MG Oral Tab Take 1 tablet (5 mg total) by mouth daily. (Patient taking differently: Take 1 tablet (5 mg total) by mouth nightly.)    nitroglycerin 0.4 MG Sublingual SL Tab Place 1 tablet (0.4 mg total) under the tongue every 5 (five) minutes as needed.       Allergies:   Allergies   Allergen Reactions    Antihistamines, Diphenhydramine-Type OTHER (SEE COMMENTS)     Unable to urinate    Fentanyl OTHER (SEE COMMENTS)     URINE RETENTION     Other OTHER (SEE COMMENTS)     Sedative from previous colonoscopy believed to have caused inability to void       History:  Past Medical History:    Anesthesia complication    Urinary retention    Atherosclerosis of coronary artery    Coronary atherosclerosis of unspecified type of vessel, native or graft    Essential hypertension    HIGH BLOOD PRESSURE    HIGH CHOLESTEROL    Hyperlipidemia    Visual impairment    GLASSES     Past  Surgical History:   Procedure Laterality Date    Angioplasty (coronary)  1996    LAD - stent    Cataract      Cath drug eluting stent  06/29/2018    2 TIM to RCA and CIRC    Colonoscopy  2009    DIFFICULTY URINATING    Colonoscopy      Other surgical history  11/2012    Carotid Endarterectomy R    Other surgical history  06/27/2019    Aliyah Gibbs     Social History     Tobacco Use    Smoking status: Never    Smokeless tobacco: Never   Substance Use Topics    Alcohol use: No     Family History   Problem Relation Age of Onset    Heart Disorder Mother     Heart Disorder Brother        Objective:  /71 (BP Location: Left arm)   Pulse 65   Temp 97 °F (36.1 °C) (Temporal)   Resp 13   Ht 66\"   Wt 118 lb (53.5 kg)   SpO2 100%   BMI 19.05 kg/m²      Exam:  General: NAD  Neck: No JVD  Lungs: CTA bilat  Heart: RRR, S1, S2  Abdomen: Soft, NT/ND, BS+x4  Extremities: Warm, dry, no LE edema bilat  Pulses: 2+ bilat DP  Skin: no rashes or legions noted  Neurological:  AAOx3, MAEW    Labs:  Please see the \"Easy-to-Access-N-Use\" [employer] EMR CareEverywherePP Tab!  No results for input(s): \"WBC\", \"HGB\", \"MCV\", \"PLT\", \"BAND\", \"INR\" in the last 168 hours.    Invalid input(s): \"LYM#\", \"MONO#\", \"BASOS#\", \"EOSIN#\"  No results for input(s): \"NA\", \"K\", \"CL\", \"CO2\", \"BUN\", \"CREATSERUM\", \"GLU\", \"CA\", \"CAION\", \"MG\", \"PHOS\" in the last 168 hours.  No results for input(s): \"ALT\", \"AST\", \"ALB\", \"AMYLASE\", \"LIPASE\", \"LDH\" in the last 168 hours.    Invalid input(s): \"ALPHOS\", \"TBIL\", \"DBIL\", \"TPROT\"        Plan:  Cardiac catheterization, coronary angio, +/- PCI.  Further recommendations after above.    Informed Consent:  I've discussed the procedure at length with the patient including the risks, benefits, and alternatives.  They wish to proceed.  I've reviewed the H&P and any changes within the last 30 days are noted above.     Thank you for allowing our group to care for your patient. Please contact me with any questions!     Kannan  KAM Fernando MD  General, Interventional  Structural & Endovascular  Cardiology

## 2025-07-22 NOTE — PAT NURSING NOTE
PreOp Instructions     You are scheduled for: a Cardiac Procedure     Date of Procedure: 07/25/25     Diet Instructions: Do not eat or drink anything after midnight including gum, mints, candy, etc.     Medications: Take Aspirin 81 mg x 4 tablets the morning of your procedure. Medications you are allowed to take can be taken with a sip of water the morning of your procedure.     Medications to Stop: DO NOT TAKE any herbal supplements and vitamins the morning of your procedure.     Skin Prep : Shower with antibacterial soap using a clean washcloth, prior to procedure. Once dried off, no lotions/powders/creams/ointments, etc., Do not shave the procedure area, this will be completed at the hospital during the preparation phase.     Arrival Time: The day prior to your procedure you will receive a phone call between 3:00 pm - 6:00 pm with your arrival time. If you haven't received a phone call, please check your voicemail messages., If you did not receive a voice mail and it is after 6:00 pm, please call the nursing supervisor at [phone].    Driving After Procedure: Sedation will be given so you WILL NOT be able to drive home. You will need a responsible adult  to drive you home. You can NOT take uber or taxi unless approved by your physician in advance.     Discharge Teaching: Your nurse will give you specific instructions before discharge, Most people can resume normal activities in 2-3 days, Any questions, please call the physician's office            parking is available starting at 6 am or [facility]. Check in at the [facility] reception desk. Our  will be there to check you in for your procedure. Please bring your insurance cards and ID with you.                                                                                                                                      Please DO NOT respond to this message, the inbasket  is not monitored for messages. For any questions, please call the physician's office.

## 2025-07-25 ENCOUNTER — HOSPITAL ENCOUNTER (OUTPATIENT)
Dept: INTERVENTIONAL RADIOLOGY/VASCULAR | Facility: HOSPITAL | Age: 89
Discharge: HOME | End: 2025-07-25
Attending: INTERNAL MEDICINE | Admitting: INTERNAL MEDICINE

## 2025-07-25 VITALS
OXYGEN SATURATION: 100 % | RESPIRATION RATE: 13 BRPM | TEMPERATURE: 97 F | HEART RATE: 59 BPM | WEIGHT: 118 LBS | SYSTOLIC BLOOD PRESSURE: 107 MMHG | DIASTOLIC BLOOD PRESSURE: 56 MMHG | BODY MASS INDEX: 18.96 KG/M2 | HEIGHT: 66 IN

## 2025-07-25 DIAGNOSIS — I25.10 CAD (CORONARY ARTERY DISEASE): ICD-10-CM

## 2025-07-25 PROCEDURE — 99211 OFF/OP EST MAY X REQ PHY/QHP: CPT

## 2025-07-25 PROCEDURE — 99152 MOD SED SAME PHYS/QHP 5/>YRS: CPT | Performed by: INTERNAL MEDICINE

## 2025-07-25 PROCEDURE — 93454 CORONARY ARTERY ANGIO S&I: CPT | Performed by: INTERNAL MEDICINE

## 2025-07-25 PROCEDURE — 93005 ELECTROCARDIOGRAM TRACING: CPT

## 2025-07-25 PROCEDURE — 93010 ELECTROCARDIOGRAM REPORT: CPT | Performed by: INTERNAL MEDICINE

## 2025-07-25 PROCEDURE — 99153 MOD SED SAME PHYS/QHP EA: CPT | Performed by: INTERNAL MEDICINE

## 2025-07-25 PROCEDURE — 92972 PERQ TRLUML CORONRY LITHOTRP: CPT | Performed by: INTERNAL MEDICINE

## 2025-07-25 RX ORDER — SODIUM CHLORIDE 9 MG/ML
INJECTION, SOLUTION INTRAVENOUS CONTINUOUS
Status: DISCONTINUED | OUTPATIENT
Start: 2025-07-25 | End: 2025-07-25

## 2025-07-25 RX ORDER — HEPARIN SODIUM 5000 [USP'U]/ML
INJECTION, SOLUTION INTRAVENOUS; SUBCUTANEOUS
Status: COMPLETED
Start: 2025-07-25 | End: 2025-07-25

## 2025-07-25 RX ORDER — CLOPIDOGREL BISULFATE 75 MG/1
TABLET ORAL
Status: COMPLETED
Start: 2025-07-25 | End: 2025-07-25

## 2025-07-25 RX ORDER — ASPIRIN 81 MG/1
324 TABLET, CHEWABLE ORAL ONCE
Status: DISCONTINUED | OUTPATIENT
Start: 2025-07-25 | End: 2025-07-25 | Stop reason: HOSPADM

## 2025-07-25 RX ORDER — CLOPIDOGREL BISULFATE 75 MG/1
75 TABLET ORAL DAILY
Qty: 90 TABLET | Refills: 1 | Status: SHIPPED | OUTPATIENT
Start: 2025-07-26

## 2025-07-25 RX ORDER — SODIUM CHLORIDE 9 MG/ML
INJECTION, SOLUTION INTRAVENOUS
Status: DISCONTINUED | OUTPATIENT
Start: 2025-07-26 | End: 2025-07-25 | Stop reason: HOSPADM

## 2025-07-25 RX ORDER — IOPAMIDOL 755 MG/ML
200 INJECTION, SOLUTION INTRAVASCULAR
Status: COMPLETED | OUTPATIENT
Start: 2025-07-25 | End: 2025-07-25

## 2025-07-25 RX ORDER — LIDOCAINE HYDROCHLORIDE 10 MG/ML
INJECTION, SOLUTION EPIDURAL; INFILTRATION; INTRACAUDAL; PERINEURAL
Status: COMPLETED
Start: 2025-07-25 | End: 2025-07-25

## 2025-07-25 RX ORDER — NITROGLYCERIN 20 MG/100ML
INJECTION INTRAVENOUS
Status: COMPLETED
Start: 2025-07-25 | End: 2025-07-25

## 2025-07-25 RX ORDER — MIDAZOLAM HYDROCHLORIDE 1 MG/ML
INJECTION INTRAMUSCULAR; INTRAVENOUS
Status: COMPLETED
Start: 2025-07-25 | End: 2025-07-25

## 2025-07-25 RX ORDER — CLOPIDOGREL BISULFATE 75 MG/1
75 TABLET ORAL DAILY
Status: DISCONTINUED | OUTPATIENT
Start: 2025-07-26 | End: 2025-07-25

## 2025-07-25 RX ORDER — ASPIRIN 81 MG/1
81 TABLET ORAL DAILY
Status: DISCONTINUED | OUTPATIENT
Start: 2025-07-26 | End: 2025-07-25

## 2025-07-25 RX ORDER — VERAPAMIL HYDROCHLORIDE 2.5 MG/ML
INJECTION INTRAVENOUS
Status: COMPLETED
Start: 2025-07-25 | End: 2025-07-25

## 2025-07-25 RX ADMIN — IOPAMIDOL 100 ML: 755 INJECTION, SOLUTION INTRAVASCULAR at 12:13:00

## 2025-07-25 NOTE — PROGRESS NOTES
Pt c/o l nipple area pain. Dr Fernando notified, EKG obtained. MD here and reviewed. Vss. No apparent distress.   No new orders. Will cont to observe.

## 2025-07-25 NOTE — DISCHARGE INSTRUCTIONS
HOME CARE INSTRUCTIONS FOLLOWING CORONARY ANGIOGRAPHY, PERIPHERAL ANGIOGRAPHY, ANGIOPLASTY (PTCA/PTA) OR INSERTION OF STENT IN THE CORONARY, CAROTID, AND/OR PERIPHERAL ARTERIES   Activity:    DO NOT drive after the procedure. You may resume driving late the following day according to the nurse or physician’s instructions    Plan on resting and relaxing tonight and tomorrow    Resume your normal activity after 48 hours, or as instructed by your physician    Do not lift anything over 10 pounds for the next 24 hours    Avoid sexual activity for the next 24 hours    Avoid drinking alcohol for the next 24 hours    If the groin site was used, avoid repeated stair use and excessive walking for the next 24 hours     What is Normal?    A small lump at the procedure site associated with mild tenderness when touched    The procedure site may be bruised or discolored    There may be a small amount of drainage on the bandage   Special Instructions:    Drink plenty of fluids during the next 24 hours to “flush” the contrast from your system    The bandage is to remain in place for 24 hours    Keep the bandage clean and dry    DO NOT submerge the procedure site for 72 hours (no bath tubs or pools)     After 24 hours, you must remove the bandage    You should shower after removing the bandage, and wash the procedure site gently with soap and water    If you choose to wear a bandage for a few days, make sure it remains clean and dry and that it is changed daily   When you should NOTIFY YOUR PHYSICIAN:    Bleeding can occur at the procedure site - both on the outside of the skin and/or beneath the surface of the skin    Swelling or a large lump at the procedure site can occur, which may be accompanied by moderate to severe pain   If either of the above occurs, lie down flat. Have someone apply pressure to the procedure site with both hands, as instructed by the nurse. Hold pressure for 20 minutes and the bleeding should stop. Notify  your physician of the occurrence   If the bleeding does not stop, call 911 and continue to apply pressure    If you experience signs of a fever, temperature >101o, chills, infection (redness, swelling, thick yellow drainage, or a foul odor from the procedure site)    If you notice any numbness, tingling, or loss of feeling to your leg or foot for groin access    If you notice any numbness, tingling, or loss of feeling to your fingers or hand, if wrist access was utilized   Continue the walking program initiated in the hospital and progress your walking as directed. Or, gradually resume your previous aerobic exercise schedule as tolerated.    Your physicians office should call you to schedule your follow up appointment if you have not heard from them by Monday,Please call your physician’s office for a follow-up appointment. You should be seen in 2 weeks.  Begin your plavix 75 mg daily starting tomorrow

## 2025-07-25 NOTE — PROCEDURES
[facility] Division of Cardiology   Cardiac Catheterization & Percutaneous Coronary Intervention     Silver Rowland Location: [facility] Catheterization Lab    CSN [id number] MRN [id number]   Admission Date 7/25/2025 Procedure Date 7/25/2025   Attending Physician Ole Castillo,* Procedure Physician Kannan Fernando MD     PREOPERATIVE DIAGNOSIS:  CAD  POSTOPERATIVE DIAGNOSIS:  CAD  PROCEDURE PERFORMED:  Coronary angiogram, lithotripsy angioplasty, PCI to LAD with TIM    PROCEDURE:    Moderate sedation: The patient was brought to the cardiac catheterization lab in the fasting state.  Informed consent was previously obtained.  Moderate sedation was employed using 4mg IV Versed and 0mcg IV Fentanyl.  I directly observed the patient from 1110 to 1201, watching the heart rate, blood pressure, oximetry, and rhythm.  An independent, trained observer was present throughout the procedure and assisted in the monitoring of the patient's level of consciousness and physiologic states.  Please see the [facility] Catheterization Report (MCRTM) in [facility] for further, technical details.  Vascular access and catheter placement:  A 6F slender sheath was utilized after micropuncture access was gained in the right radial.  A radial cocktail was given for vasospasm.  Heparin was given IV for radial patency.  A 5F Roosevelt catheter was used for angiography.  Basic over the wire technique was utilized.  RODOLFO and DE LEON angiographic views with caudal and cranial angulation were used for cineangiography.  Hemodynamics were measured in the standard fashion using fluid filled, pressure manometry via the ASSISTTM device and analyzed with the [employer] catheterization software.  At the end of the case, a radial compression device was used for arterial hemostasis and our standard closure protocol was followed.  Coronary or endovascular intervention: EBU 3.5 guide.  BMW wire.  2.0mm predilation balloon.  Then 3.0mm  Shockwave Lithotripsy Angioplasty making several infaltions with multiple pulses and good expansion.  We stented with a 3.0x22mm [place] TIM at 14ATM and posted with a 3.5mm NC to high pressure.  We gave 200mcg IC NTG and ended the case.        DIAGNOSTIC FINDINGS:   Coronary angiography:    LMCA: The left main artery is minimally diseased.  LAD:  The left anterior descending artery is large with ostial 80-90% stenosis which is proximal to an old mid LAD stent.  The prior stent is widely patent with no ISR.  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  It has mild mid to distal.  LCX: The left circumflex artery is moderate system with a patent stent in mid CX with no ISR and patent OM branches.  Mild disease.   RCA:  The right coronary artery is dominant, large vessel with a stent noted with no ISR and an ulcerated 70% stenosis in the mid RCA.  The lesion is >10mm from the old stent.    MEDICATIONS:  See nursing records, MCR, and EMR.     COMPLICATIONS:  None.     IMPRESSION:    CAD  Patent stents in LAD, CX, RCA  De jagruti lesions in ostial LAD and mid RCA  S/P PCI to LAD with lithotripsy angioplasty and TIM with an excellent result.    RECOMMENDATIONS:   DAPT 1 year, loaded plavix.  FU with Dr. Handy as previously scheduled.       Kannan Fernando MD  General, Interventional  Structural & Endovascular  Cardiology

## 2025-07-25 NOTE — DIETARY NOTE
Clinical Nutrition    Dietitian consult received per cardiac rehab standing order. Pt to be educated by cardiac rehab staff and encouraged to attend outpatient classes taught by RD. RD available PRN.    Liana Manzano MS, RD, LDN  Clinical Dietitian  Ext: [phone]

## 2025-07-25 NOTE — PROGRESS NOTES
Pt ambulated to bathroom, voided and tolerated po well. Rt wrist mild ecchymosis and remains unchanged. IV d.cd and discharge instructions reviewed with pt and support person. Pt discharged to home via wheelchair in stable condition

## 2025-07-25 NOTE — CARDIAC REHAB
Outpatient Cardiac Rehab Phase 2 discussed and offered to patient's family as patient was asleep.  Patient to call to schedule.  Contact information given. CAD education completed.   Patient has stent card

## 2025-07-26 LAB
ATRIAL RATE: 54 BPM
ATRIAL RATE: 57 BPM
P AXIS: 67 DEGREES
P AXIS: 69 DEGREES
P-R INTERVAL: 182 MS
P-R INTERVAL: 190 MS
Q-T INTERVAL: 422 MS
Q-T INTERVAL: 464 MS
QRS DURATION: 88 MS
QRS DURATION: 92 MS
QTC CALCULATION (BEZET): 410 MS
QTC CALCULATION (BEZET): 440 MS
R AXIS: 92 DEGREES
R AXIS: 96 DEGREES
T AXIS: 73 DEGREES
T AXIS: 76 DEGREES
VENTRICULAR RATE: 54 BPM
VENTRICULAR RATE: 57 BPM

## (undated) NOTE — LETTER
[facility] [address], [address]  Consent for Procedure/Sedation    Date:     Time:       1.  I authorize the performance upon Kathryn Will the following:cardiac catheterization, left ventricular cineangiography, bilateral harrison period, the physician will determine when the applicable recovery period ends for purposes of reinstating the Do Not Resuscitate (DNR) order.     Signature of Patient: ____________________________________________________    Signature of person authorized

## (undated) NOTE — LETTER
[facility] [address], [address] [place] [address]    Consent for Anesthesia   1.    Bianca Court agree to be cared for by an anesthesiologist, who is specially trained to monitor me and give me medicine to put me to sleep or keep me comforta vision, nerves, or muscles and in extremely rare instances death. 5. My doctor has explained to me other choices available to me for my care (alternatives).   6. Pregnant Patients (“epidural”):  I understand that the risks of having an epidural (medicine g

## (undated) NOTE — LETTER
[facility] [address], [address]  Consent for Procedure/Sedation    Date:     Time:       1.  I authorize the performance upon Barbie Alpers the following:cardiac catheterization, left ventricular cineangiography, bilateral harrison period, the physician will determine when the applicable recovery period ends for purposes of reinstating the Do Not Resuscitate (DNR) order.     Signature of Patient: ____________________________________________________    Signature of person authorized

## (undated) NOTE — ED AVS SNAPSHOT
Devon Huff   MRN: [id number]    Department:  [facility] Emergency Department   Date of Visit:  6/27/2019           Disclosure     Insurance plans vary and the physician(s) referred by the ER may not be covered by your plan.  Please contact you tell this physician (or your personal doctor if your instructions are to return to your personal doctor) about any new or lasting problems. The primary care or specialist physician will see patients referred from the [facility] Emergency Department.  Gisella Sharma

## (undated) NOTE — ED AVS SNAPSHOT
Dev Troy   MRN: [id number]    Department:  [facility] Emergency Department   Date of Visit:  5/18/2019           Disclosure     Insurance plans vary and the physician(s) referred by the ER may not be covered by your plan.  Please contact you tell this physician (or your personal doctor if your instructions are to return to your personal doctor) about any new or lasting problems. The primary care or specialist physician will see patients referred from the [facility] Emergency Department.  Mane Walton